# Patient Record
Sex: MALE | Race: WHITE | NOT HISPANIC OR LATINO | Employment: PART TIME | URBAN - METROPOLITAN AREA
[De-identification: names, ages, dates, MRNs, and addresses within clinical notes are randomized per-mention and may not be internally consistent; named-entity substitution may affect disease eponyms.]

---

## 2022-01-03 ENCOUNTER — NURSE TRIAGE (OUTPATIENT)
Dept: OTHER | Facility: OTHER | Age: 22
End: 2022-01-03

## 2022-01-03 DIAGNOSIS — Z20.822 SUSPECTED SEVERE ACUTE RESPIRATORY SYNDROME CORONAVIRUS 2 (SARS-COV-2) INFECTION: Primary | ICD-10-CM

## 2022-01-03 PROCEDURE — 87636 SARSCOV2 & INF A&B AMP PRB: CPT | Performed by: FAMILY MEDICINE

## 2022-01-03 NOTE — TELEPHONE ENCOUNTER
1  Were you within 6 feet or less, for up to 15 minutes or more with a person that has a confirmed COVID-19 test?  Denies  2  What was the date of your exposure? N/A  3  Are you experiencing any symptoms attributed to the virus?  (Assess for SOB, cough, fever, difficulty breathing) Sore throat, cough, fatigue, body aches, hot flashes, chills, fever (101 9) onset 12/31/21  4  HIGH RISK: Do you have any history heart or lung conditions, weakened immune system, diabetes, Asthma, CHF, HIV, COPD, Chemo, renal failure, sickle cell, etc? Asthma   5  PREGNANCY: Are you pregnant or did you recently give birth?  N/A  6  VACCINE: "Have you gotten the COVID-19 vaccine?" If Yes ask: "Which one, how many shots, when did you get it?" Denies     Reason for Disposition   [1] COVID-19 infection suspected by caller or triager AND [2] mild symptoms (cough, fever, or others) AND [3] has not gotten tested yet    Protocols used: CORONAVIRUS (COVID-19) DIAGNOSED OR SUSPECTED-ADULT-[place]

## 2022-01-03 NOTE — TELEPHONE ENCOUNTER
Regarding: Symptomatic COVID #2 of 2   ----- Message from Boyd Zapata sent at 1/3/2022  5:03 PM EST -----  Sore throat, cough, fatigue, body aches, hot/cold flashes, and fever 101 9 (oral)

## 2022-01-07 LAB
FLUAV RNA RESP QL NAA+PROBE: NEGATIVE
FLUBV RNA RESP QL NAA+PROBE: NEGATIVE
SARS-COV-2 RNA RESP QL NAA+PROBE: POSITIVE

## 2022-05-11 ENCOUNTER — OFFICE VISIT (OUTPATIENT)
Dept: URGENT CARE | Age: 22
End: 2022-05-11
Payer: COMMERCIAL

## 2022-05-11 VITALS
TEMPERATURE: 98.6 F | HEART RATE: 97 BPM | HEIGHT: 73 IN | RESPIRATION RATE: 20 BRPM | DIASTOLIC BLOOD PRESSURE: 70 MMHG | WEIGHT: 119 LBS | SYSTOLIC BLOOD PRESSURE: 110 MMHG | OXYGEN SATURATION: 99 % | BODY MASS INDEX: 15.77 KG/M2

## 2022-05-11 DIAGNOSIS — R42 VERTIGO: Primary | ICD-10-CM

## 2022-05-11 DIAGNOSIS — Z76.89 ESTABLISHING CARE WITH NEW DOCTOR, ENCOUNTER FOR: ICD-10-CM

## 2022-05-11 PROCEDURE — 99213 OFFICE O/P EST LOW 20 MIN: CPT

## 2022-05-11 RX ORDER — CETIRIZINE HYDROCHLORIDE 10 MG/1
10 TABLET ORAL DAILY
COMMUNITY

## 2022-05-11 RX ORDER — MECLIZINE HYDROCHLORIDE 25 MG/1
25 TABLET ORAL EVERY 8 HOURS PRN
Qty: 30 TABLET | Refills: 0 | Status: SHIPPED | OUTPATIENT
Start: 2022-05-11 | End: 2022-05-25

## 2022-05-11 NOTE — PROGRESS NOTES
[facility]        NAME: Sintia Mina is a 24 y o  male  : 2000    MRN: [id number]  DATE: May 11, 2022  TIME: 10:55 AM    Assessment and Plan   Vertigo [R42]  1  Vertigo  meclizine (ANTIVERT) 25 mg tablet   2  Establishing care with new doctor, encounter for  Ambulatory Referral to [facility]     The patient states for the past 3 weeks he has been having episodes of intermittent nausea and dizziness that worsens with sudden movements   He states has had about 5 episodes  Symptoms resolve with rest and water consumption  Denies headaches, palpitations, chest pain  Denies fevers  Denies syncope  At this time patient appears to have symptoms of vertigo  He is awake , alert at current time  He also does not have a PCP   Will order Meclizine and provide PCP referral  Advised to proceed to ER for worsening symptoms  Patient Instructions     Take Meclizine as prescribed  Follow up with PCP referral  Proceed to  ER if symptoms worsen  Chief Complaint     Chief Complaint   Patient presents with    Dizziness     Patient states that for the last couple of weeks he has had episodes of dizziness and nausea  He states it gets better when he drinks water  Patient states symptoms are intermittent         History of Present Illness       The patient states for the past 3 weeks he has been having episodes of intermittent nausea and dizziness that worsens with sudden movements   He states has had about 5 episodes  Symptoms resolve with rest and water consumption  Denies headaches, palpitations, chest pain  Denies fevers  Denies syncope  Review of Systems   Review of Systems   Constitutional: Negative for chills and fever  HENT: Negative for congestion, ear pain, postnasal drip, sinus pain and sore throat  Eyes: Negative for pain and itching  Respiratory: Negative for cough, shortness of breath and wheezing  Cardiovascular: Negative for chest pain and palpitations     Gastrointestinal: Negative for abdominal pain, constipation, diarrhea, nausea and vomiting  Genitourinary: Negative for difficulty urinating and hematuria  Musculoskeletal: Negative for arthralgias and myalgias  Skin: Negative for rash  Neurological: Positive for dizziness  Negative for seizures, syncope, light-headedness, numbness and headaches  Psychiatric/Behavioral: Negative for agitation and sleep disturbance  The patient is not nervous/anxious  Current Medications       Current Outpatient Medications:     cetirizine (ZyrTEC) 10 mg tablet, Take 10 mg by mouth in the morning , Disp: , Rfl:     meclizine (ANTIVERT) 25 mg tablet, Take 1 tablet (25 mg total) by mouth every 8 (eight) hours as needed for dizziness for up to 14 days, Disp: 30 tablet, Rfl: 0    Current Allergies     Allergies as of 05/11/2022    (No Known Allergies)            The following portions of the patient's history were reviewed and updated as appropriate: allergies, current medications, past family history, past medical history, past social history, past surgical history and problem list      Past Medical History:   Diagnosis Date    Allergic        History reviewed  No pertinent surgical history  History reviewed  No pertinent family history  Medications have been verified  Objective   /70   Pulse 97   Temp 98 6 °F (37 °C)   Resp 20   Ht 6' 1" (1 854 m)   Wt 54 kg (119 lb)   SpO2 99%   BMI 15 70 kg/m²   No LMP for male patient  Physical Exam     Physical Exam  Vitals reviewed  Constitutional:       General: He is not in acute distress  Appearance: Normal appearance  HENT:      Head: Normocephalic and atraumatic  Right Ear: Tympanic membrane and ear canal normal       Left Ear: Tympanic membrane and ear canal normal       Mouth/Throat:      Mouth: Mucous membranes are moist       Pharynx: No oropharyngeal exudate or posterior oropharyngeal erythema     Eyes:      Extraocular Movements: Extraocular movements intact  Conjunctiva/sclera: Conjunctivae normal       Pupils: Pupils are equal, round, and reactive to light  Cardiovascular:      Rate and Rhythm: Normal rate and regular rhythm  Pulses: Normal pulses  Heart sounds: Normal heart sounds  No murmur heard  Pulmonary:      Effort: Pulmonary effort is normal  No respiratory distress  Breath sounds: Normal breath sounds  Abdominal:      General: Abdomen is flat  Bowel sounds are normal  There is no distension  Palpations: Abdomen is soft  Tenderness: There is no abdominal tenderness  There is no guarding or rebound  Musculoskeletal:         General: No swelling or tenderness  Normal range of motion  Cervical back: Normal range of motion and neck supple  No tenderness  Skin:     General: Skin is warm  Neurological:      General: No focal deficit present  Mental Status: He is alert and oriented to person, place, and time  Mental status is at baseline  Cranial Nerves: No cranial nerve deficit  Sensory: No sensory deficit  Motor: No weakness        Coordination: Coordination normal       Gait: Gait normal    Psychiatric:         Mood and Affect: Mood normal          Behavior: Behavior normal          Judgment: Judgment normal

## 2022-05-11 NOTE — LETTER
May 11, 2022     Patient: Raya Che   YOB: 2000   Date of Visit: 5/11/2022       To Whom it May Concern: Raya Che was seen in my clinic on 5/11/2022  He may return to work on 5/11/2022  Please excuse from work on 5/10/22       If you have any questions or concerns, please don't hesitate to call           Sincerely,          KRISTIN FRIAS        CC: No Recipients

## 2022-08-07 ENCOUNTER — OFFICE VISIT (OUTPATIENT)
Dept: URGENT CARE | Age: 22
End: 2022-08-07
Payer: COMMERCIAL

## 2022-08-07 VITALS
BODY MASS INDEX: 15.72 KG/M2 | HEIGHT: 73 IN | HEART RATE: 72 BPM | OXYGEN SATURATION: 99 % | WEIGHT: 118.6 LBS | RESPIRATION RATE: 20 BRPM | TEMPERATURE: 97.5 F

## 2022-08-07 DIAGNOSIS — J36 TONSILLAR ABSCESS: Primary | ICD-10-CM

## 2022-08-07 PROCEDURE — G0382 LEV 3 HOSP TYPE B ED VISIT: HCPCS | Performed by: PHYSICIAN ASSISTANT

## 2022-08-07 NOTE — PROGRESS NOTES
[address] [facility]        NAME: Nancy Tejeda is a 25 y o  male  : 2000    MRN: [id number]  DATE: 2022  TIME: 2:52 PM    Assessment and Plan   Tonsillar abscess [J36]  1  Tonsillar abscess  Transfer to other facility   Patient presents with sore throat for 3 days duration  He notes voice changes and is having a lot pain with swallowing  On examination patient has a hot potato voice and trismus of the jaw  Visible tonsillar abscess on the left side  Discussed with the patient that the abscess will need to be drained and I am unable to the that this location  He is recommended to reports the emergency room  Patient's airways patent at this time with no evidence of impending airway compromise he is safe to drive himself to the ER  Patient has requested referral to [facility] [address] emergency department  Patient Instructions   Report directly to [facility] [address] ER for further evaluation  Chief Complaint     Chief Complaint   Patient presents with    Cold Like Symptoms     Fever, sore throat,  chills x3days         History of Present Illness       51-year-old male presents with complaint of sore throat, fever, and chills for 3 days duration  Patient notes voice changes and is having severe pain with swallowing  He denies any runny nose, congestion, cough, chest pain, shortness of breath, nausea, vomiting, diarrhea, loss of taste, loss of smell  He has not vaccinated for COVID but denies any recent cough sick contacts  Review of Systems   Review of Systems   Constitutional: Positive for chills and fever  HENT: Positive for sore throat, trouble swallowing and voice change  Negative for congestion, postnasal drip and rhinorrhea  Respiratory: Negative for cough and shortness of breath  Cardiovascular: Negative for chest pain  Gastrointestinal: Negative for diarrhea, nausea and vomiting           Current Medications       Current Outpatient Medications:    cetirizine (ZyrTEC) 10 mg tablet, Take 10 mg by mouth in the morning  (Patient not taking: Reported on 8/7/2022), Disp: , Rfl:     meclizine (ANTIVERT) 25 mg tablet, Take 1 tablet (25 mg total) by mouth every 8 (eight) hours as needed for dizziness for up to 14 days (Patient not taking: Reported on 8/7/2022), Disp: 30 tablet, Rfl: 0    Current Allergies     Allergies as of 08/07/2022    (No Known Allergies)            The following portions of the patient's history were reviewed and updated as appropriate: allergies, current medications, past family history, past medical history, past social history, past surgical history and problem list      Past Medical History:   Diagnosis Date    Allergic        History reviewed  No pertinent surgical history  History reviewed  No pertinent family history  Medications have been verified  Objective   Pulse 72   Temp 97 5 °F (36 4 °C) (Tympanic)   Resp 20   Ht 6' 1" (1 854 m)   Wt 53 8 kg (118 lb 9 6 oz)   SpO2 99%   BMI 15 65 kg/m²   No LMP for male patient  Physical Exam     Physical Exam  Vitals and nursing note reviewed  Constitutional:       General: He is not in acute distress  Appearance: Normal appearance  He is not ill-appearing or toxic-appearing  HENT:      Head: Normocephalic and atraumatic  Jaw: Trismus present  Right Ear: Tympanic membrane, ear canal and external ear normal  There is no impacted cerumen  No foreign body  Left Ear: Tympanic membrane, ear canal and external ear normal  There is no impacted cerumen  No foreign body  Nose: No nasal deformity, mucosal edema, congestion or rhinorrhea  Right Nostril: No foreign body, epistaxis or occlusion  Left Nostril: No foreign body, epistaxis or occlusion  Right Turbinates: Not enlarged, swollen or pale  Left Turbinates: Not enlarged, swollen or pale  Mouth/Throat:      Lips: Pink  No lesions        Mouth: Mucous membranes are moist  No injury, oral lesions or angioedema  Dentition: Normal dentition  Tongue: No lesions  Tongue does not deviate from midline  Palate: No mass and lesions  Pharynx: Uvula midline  Pharyngeal swelling and posterior oropharyngeal erythema present  No oropharyngeal exudate or uvula swelling  Tonsils: Tonsillar abscess (Left side) present  No tonsillar exudate  Comments: Uvula deviated to the right  Eyes:      General: Lids are normal  Gaze aligned appropriately  No allergic shiner  Extraocular Movements: Extraocular movements intact  Cardiovascular:      Rate and Rhythm: Normal rate  Pulmonary:      Effort: Pulmonary effort is normal       Comments: Patient speaking in full sentences with no increased respiratory effort  No audible wheezing or stridor  Lymphadenopathy:      Cervical: No cervical adenopathy  Skin:     General: Skin is warm and dry  Neurological:      Mental Status: He is alert and oriented to person, place, and time  Coordination: Coordination is intact  Gait: Gait is intact  Psychiatric:         Attention and Perception: Attention and perception normal          Mood and Affect: Mood and affect normal          Speech: Speech normal          Behavior: Behavior is cooperative  Note: Portions of this record may have been created with voice recognition software  Occasional wrong word or "sound a like" substitutions may have occurred due to the inherent limitations of voice recognition software  Please read the chart carefully and recognize, using context, where substitutions have occurred  *

## 2022-10-06 ENCOUNTER — OFFICE VISIT (OUTPATIENT)
Dept: URGENT CARE | Age: 22
End: 2022-10-06
Payer: COMMERCIAL

## 2022-10-06 ENCOUNTER — APPOINTMENT (OUTPATIENT)
Dept: RADIOLOGY | Age: 22
End: 2022-10-06
Payer: COMMERCIAL

## 2022-10-06 VITALS
WEIGHT: 120.2 LBS | TEMPERATURE: 98.3 F | SYSTOLIC BLOOD PRESSURE: 130 MMHG | HEIGHT: 73 IN | DIASTOLIC BLOOD PRESSURE: 88 MMHG | BODY MASS INDEX: 15.93 KG/M2 | HEART RATE: 90 BPM | RESPIRATION RATE: 20 BRPM

## 2022-10-06 DIAGNOSIS — R07.9 CHEST PAIN, UNSPECIFIED TYPE: ICD-10-CM

## 2022-10-06 DIAGNOSIS — R07.9 CHEST PAIN, UNSPECIFIED TYPE: Primary | ICD-10-CM

## 2022-10-06 PROCEDURE — 93005 ELECTROCARDIOGRAM TRACING: CPT

## 2022-10-06 PROCEDURE — 99213 OFFICE O/P EST LOW 20 MIN: CPT

## 2022-10-06 PROCEDURE — 71046 X-RAY EXAM CHEST 2 VIEWS: CPT

## 2022-10-06 RX ORDER — AZITHROMYCIN 250 MG/1
TABLET, FILM COATED ORAL
Qty: 6 TABLET | Refills: 0 | Status: SHIPPED | OUTPATIENT
Start: 2022-10-06 | End: 2022-10-10

## 2022-10-06 NOTE — PROGRESS NOTES
[facility]        NAME: Gila Chen is a 25 y o  male  : 2000    MRN: [id number]  DATE: 2022  TIME: 8:12 PM    Assessment and Plan   Chest pain, unspecified type [R07 9]  1  Chest pain, unspecified type  XR chest pa & lateral         Patient Instructions     EKG: NSR 92 bpm   CXR: lungs are clear however possible nodule(s) L side  Instructed to follow up with PCP  Running rapid COVID in office: Rapid COVID testing negative  Anxiety vs GERD vs MSK  Instructed to proceed to ER if chest tightness persists or symptoms worsen  Patient verbalized understanding  Chief Complaint     Chief Complaint   Patient presents with    Chest Pain     Sharp chest pain, trouble breathing, lightheaded, dizziness started around 1500 today         History of Present Illness     25 y o  M presents with complaints of mid-sternal chest pain, chest tightness, lightheaded and dizziness starting at 5 pm today  States he was at home when the symptoms started and now the lightheadedness and dizziness have resolved  Denies N/V  Denies URI symptoms  States this same scenario has occurred in the past several times and has resolved on its own  When asking what prompted him to come in this time, he states the symptoms were lasting longer than normal  Denies hx of GERD or anxiety  Denies cardiac hx - 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 in his chart - unable to view documentation and patient states he does not remember this  Has not taken anything OTC today  Denies drug use  Works at [employer] and was at work today  Denies strenuous activity  Denies trauma or injury  Denies stress  Denies reflux symptoms  Denies hx of PE/DVT  Patient states only medication he takes is Zyrtec  Off note, states he received his first COVID vaccine on   Hx of COVID infection - he is unsure if it was last year or the beginning of this year      Review of Systems   Review of Systems   Constitutional: Negative for chills, fatigue and fever  HENT: Negative for congestion, ear discharge, ear pain, facial swelling, rhinorrhea, sinus pressure, sinus pain, sore throat and trouble swallowing  Eyes: Negative for photophobia, pain and visual disturbance  Respiratory: Positive for chest tightness  Negative for cough, shortness of breath and wheezing  Cardiovascular: Positive for chest pain  Negative for palpitations and leg swelling  Gastrointestinal: Negative for abdominal pain, diarrhea, nausea and vomiting  Genitourinary: Negative for dysuria, flank pain and hematuria  Musculoskeletal: Negative for arthralgias, back pain, myalgias and neck pain  Skin: Negative for pallor and wound  Neurological: Positive for dizziness and light-headedness  Negative for syncope, weakness, numbness and headaches  Psychiatric/Behavioral: Negative for confusion and sleep disturbance  Current Medications       Current Outpatient Medications:     cetirizine (ZyrTEC) 10 mg tablet, Take 10 mg by mouth in the morning  (Patient not taking: Reported on 8/7/2022), Disp: , Rfl:     meclizine (ANTIVERT) 25 mg tablet, Take 1 tablet (25 mg total) by mouth every 8 (eight) hours as needed for dizziness for up to 14 days (Patient not taking: Reported on 8/7/2022), Disp: 30 tablet, Rfl: 0    Current Allergies     Allergies as of 10/06/2022    (No Known Allergies)            The following portions of the patient's history were reviewed and updated as appropriate: allergies, current medications, past family history, past medical history, past social history, past surgical history and problem list      Past Medical History:   Diagnosis Date    Allergic        History reviewed  No pertinent surgical history  History reviewed  No pertinent family history  Medications have been verified          Objective   /88 (BP Location: Left arm, Patient Position: Supine)   Pulse 90   Temp 98 3 °F (36 8 °C)   Resp 20   Ht 6' 1" (1 854 m)   Wt 54 5 kg (120 lb 3 2 oz)   BMI 15 86 kg/m²   No LMP for male patient  Physical Exam     Physical Exam  Vitals reviewed  Constitutional:       General: He is not in acute distress  Appearance: He is normal weight  He is not ill-appearing or toxic-appearing  HENT:      Head: Normocephalic  Right Ear: Tympanic membrane normal  No middle ear effusion  Tympanic membrane is not erythematous or bulging  Left Ear: Tympanic membrane normal   No middle ear effusion  Tympanic membrane is not erythematous or bulging  Nose: Nose normal       Right Sinus: No maxillary sinus tenderness or frontal sinus tenderness  Left Sinus: No maxillary sinus tenderness or frontal sinus tenderness  Mouth/Throat:      Mouth: Mucous membranes are moist       Pharynx: Oropharynx is clear  Uvula midline  No oropharyngeal exudate, posterior oropharyngeal erythema or uvula swelling  Tonsils: No tonsillar exudate or tonsillar abscesses  Comments: Airway clear  Eyes:      Extraocular Movements: Extraocular movements intact  Conjunctiva/sclera: Conjunctivae normal       Pupils: Pupils are equal, round, and reactive to light  Cardiovascular:      Rate and Rhythm: Normal rate and regular rhythm  Pulses: Normal pulses  Heart sounds: Normal heart sounds  Comments: RRR no M/R/G  Pulmonary:      Effort: Pulmonary effort is normal  No tachypnea, accessory muscle usage or respiratory distress  Breath sounds: Normal breath sounds and air entry  No decreased air movement  No decreased breath sounds, wheezing, rhonchi or rales  Chest:      Chest wall: Tenderness present  Comments:   +TTP L chest wall inferior to clavicle  Pain is not made worse by abduction  Abdominal:      General: Bowel sounds are normal       Palpations: Abdomen is soft  Tenderness: There is no abdominal tenderness     Musculoskeletal:         General: Normal range of motion  Cervical back: Normal range of motion and neck supple  Lymphadenopathy:      Cervical: No cervical adenopathy  Skin:     General: Skin is warm and dry  Capillary Refill: Capillary refill takes less than 2 seconds  Neurological:      General: No focal deficit present  Mental Status: He is alert  Cranial Nerves: Cranial nerves are intact  No cranial nerve deficit  Sensory: Sensation is intact  Motor: Motor function is intact  Coordination: Coordination is intact  Deep Tendon Reflexes: Reflexes are normal and symmetric        Comments: Normal non focal neuro exam

## 2022-10-07 LAB
ATRIAL RATE: 92 BPM
ATRIAL RATE: 92 BPM
P AXIS: 63 DEGREES
P AXIS: 63 DEGREES
PR INTERVAL: 124 MS
PR INTERVAL: 124 MS
QRS AXIS: 83 DEGREES
QRS AXIS: 83 DEGREES
QRSD INTERVAL: 86 MS
QRSD INTERVAL: 86 MS
QT INTERVAL: 312 MS
QT INTERVAL: 312 MS
QTC INTERVAL: 385 MS
QTC INTERVAL: 385 MS
T WAVE AXIS: 77 DEGREES
T WAVE AXIS: 77 DEGREES
VENTRICULAR RATE: 92 BPM
VENTRICULAR RATE: 92 BPM

## 2022-10-07 PROCEDURE — 93010 ELECTROCARDIOGRAM REPORT: CPT | Performed by: INTERNAL MEDICINE

## 2022-10-07 NOTE — PATIENT INSTRUCTIONS
Your x-ray showed possible infiltrates/ground glass opacities that are concerning for pneumonia or in the setting of COVID-19 infection  Your rapid COVID test was negative in office today  Start Azithromycin (Anne Decker) as directed  Await final radiologist read on x-ray  If pain continues or worsens, proceed to the ER  If any new symptoms develop or shortness of breath, proceed to the ER  Follow up with PCP in 3-5 days

## 2024-11-26 ENCOUNTER — OFFICE VISIT (OUTPATIENT)
Dept: URGENT CARE | Age: 24
End: 2024-11-26

## 2024-11-26 VITALS
DIASTOLIC BLOOD PRESSURE: 70 MMHG | SYSTOLIC BLOOD PRESSURE: 126 MMHG | RESPIRATION RATE: 20 BRPM | OXYGEN SATURATION: 99 % | TEMPERATURE: 97.1 F | HEART RATE: 102 BPM
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
## 2024-11-26 DIAGNOSIS — S80.862A TICK BITE OF LEFT LOWER LEG, INITIAL ENCOUNTER: Primary | ICD-10-CM

## 2024-11-26 PROCEDURE — 90675 RABIES VACCINE IM: CPT

## 2024-11-26 PROCEDURE — 99213 OFFICE O/P EST LOW 20 MIN: CPT | Performed by: EMERGENCY MEDICINE

## 2024-11-26 PROCEDURE — 90715 TDAP VACCINE 7 YRS/> IM: CPT

## 2024-11-26 RX ORDER — DOXYCYCLINE 100 MG/1
200 TABLET ORAL ONCE
Qty: 2 TABLET | Refills: 0 | Status: SHIPPED | OUTPATIENT
Start: 2024-11-26 | End: 2024-11-26

## 2024-11-26 NOTE — PATIENT INSTRUCTIONS
Take antibiotic as prescribed. Recommend probiotic use while taking antibiotic.   Avoid direct sunlight with use of doxycyline, reapply SPF 50 at least every 1-2 hours, wear long sleeves, and a wide brimmed hat.  Acetaminophen or ibuprofen for fever and pain. Follow-up with PCP in 3-5 days. Go to ER if symptoms worsen.

## 2024-11-26 NOTE — PROGRESS NOTES
[facility]        NAME: Javon Rosales is a 24 y.o. male  : 2000    MRN: [id number]  DATE: 2024  TIME: 1:30 PM      Assessment and Plan     Tick bite of left lower leg, initial encounter [S80.862A, W57.XXXA]  1. Tick bite of left lower leg, initial encounter  Tdap Vaccine greater than or equal to 6yo    doxycycline (ADOXA) 100 MG tablet          Tdap given IM     Patient Instructions     Take antibiotic as prescribed. Recommend probiotic use while taking antibiotic.   Avoid direct sunlight with use of doxycyline, reapply SPF 50 at least every 1-2 hours, wear long sleeves, and a wide brimmed hat.  Acetaminophen or ibuprofen for fever and pain. Follow-up with PCP in 3-5 days. Go to ER if symptoms worsen.     Chief Complaint     Chief Complaint   Patient presents with    Tick Removal     Onset 24 Patient states he has a tick embedded on his right lower left leg.          History of Present Illness     Patient is a 24-year-old male who presents with tick to left lower leg.  States he was outside yesterday.  Denies fever, nausea, vomiting, headache or bodyaches.  Last documented Tdap .        Review of Systems     Review of Systems   Constitutional:  Negative for fever.   Gastrointestinal:  Negative for constipation, diarrhea and vomiting.   Musculoskeletal:  Negative for arthralgias and joint swelling.   Skin:  Positive for wound.   Neurological:  Negative for headaches.   All other systems reviewed and are negative.        Current Medications       Current Outpatient Medications:     doxycycline (ADOXA) 100 MG tablet, Take 2 tablets (200 mg total) by mouth 1 (one) time for 1 dose, Disp: 2 tablet, Rfl: 0    cetirizine (ZyrTEC) 10 mg tablet, Take 10 mg by mouth in the morning. (Patient not taking: Reported on 2022), Disp: , Rfl:     meclizine (ANTIVERT) 25 mg tablet, Take 1 tablet (25 mg total) by mouth every 8 (eight) hours as needed for dizziness for up to 14 days (Patient  not taking: Reported on 8/7/2022), Disp: 30 tablet, Rfl: 0    Current Allergies     Allergies as of 11/26/2024    (No Known Allergies)              The following portions of the patient's history were reviewed and updated as appropriate: allergies, current medications, past family history, past medical history, past social history, past surgical history and problem list.     Past Medical History:   Diagnosis Date    Allergic        History reviewed. No pertinent surgical history.    History reviewed. No pertinent family history.      Medications have been verified.        Objective     /70   Pulse 102   Temp (!) 97.1 °F (36.2 °C)   Resp 20   SpO2 99%   No LMP for male patient.         Physical Exam     Physical Exam  Vitals and nursing note reviewed.   Constitutional:       General: He is not in acute distress.     Appearance: Normal appearance. He is not ill-appearing, toxic-appearing or diaphoretic.   Musculoskeletal:        Legs:    Skin:     General: Skin is warm.      Capillary Refill: Capillary refill takes less than 2 seconds.   Neurological:      Mental Status: He is alert.   Psychiatric:         Mood and Affect: Mood normal.         Behavior: Behavior normal.         Thought Content: Thought content normal.         Judgment: Judgment normal.     Universal Protocol:  procedure performed by consultantConsent: Verbal consent obtained.  Risks and benefits: risks, benefits and alternatives were discussed  Consent given by: patient  Patient understanding: patient states understanding of the procedure being performed  Patient identity confirmed: verbally with patient  Foreign body removal    Date/Time: 11/26/2024 1:00 PM    Performed by: ELVIA Levi  Authorized by: ELVIA Levi  Intake: Left left.    Sedation:  Patient sedated: no  Complexity: simple  1 objects recovered.  Objects recovered: tick  Post-procedure assessment: foreign body removed  Patient tolerance: patient tolerated  the procedure well with no immediate complications  Comments: Bacitracin applied